# Patient Record
Sex: FEMALE | Race: ASIAN | NOT HISPANIC OR LATINO | URBAN - METROPOLITAN AREA
[De-identification: names, ages, dates, MRNs, and addresses within clinical notes are randomized per-mention and may not be internally consistent; named-entity substitution may affect disease eponyms.]

---

## 2021-11-16 PROBLEM — Z00.00 ENCOUNTER FOR PREVENTIVE HEALTH EXAMINATION: Status: ACTIVE | Noted: 2021-11-16

## 2021-11-23 ENCOUNTER — EMERGENCY (EMERGENCY)
Facility: HOSPITAL | Age: 26
LOS: 1 days | Discharge: HOME | End: 2021-11-23
Attending: EMERGENCY MEDICINE | Admitting: EMERGENCY MEDICINE
Payer: SELF-PAY

## 2021-11-23 VITALS
DIASTOLIC BLOOD PRESSURE: 69 MMHG | SYSTOLIC BLOOD PRESSURE: 117 MMHG | RESPIRATION RATE: 16 BRPM | OXYGEN SATURATION: 100 % | TEMPERATURE: 98 F | HEART RATE: 90 BPM

## 2021-11-23 VITALS
OXYGEN SATURATION: 100 % | RESPIRATION RATE: 18 BRPM | SYSTOLIC BLOOD PRESSURE: 106 MMHG | TEMPERATURE: 99 F | DIASTOLIC BLOOD PRESSURE: 68 MMHG | HEART RATE: 100 BPM

## 2021-11-23 LAB
ALBUMIN SERPL ELPH-MCNC: 5.1 G/DL — HIGH (ref 3.3–5)
ALP SERPL-CCNC: 60 U/L — SIGNIFICANT CHANGE UP (ref 40–120)
ALT FLD-CCNC: 13 U/L — SIGNIFICANT CHANGE UP (ref 4–33)
ANION GAP SERPL CALC-SCNC: 13 MMOL/L — SIGNIFICANT CHANGE UP (ref 7–14)
APPEARANCE UR: CLEAR — SIGNIFICANT CHANGE UP
AST SERPL-CCNC: 16 U/L — SIGNIFICANT CHANGE UP (ref 4–32)
BASOPHILS # BLD AUTO: 0.04 K/UL — SIGNIFICANT CHANGE UP (ref 0–0.2)
BASOPHILS NFR BLD AUTO: 0.4 % — SIGNIFICANT CHANGE UP (ref 0–2)
BILIRUB SERPL-MCNC: 0.2 MG/DL — SIGNIFICANT CHANGE UP (ref 0.2–1.2)
BILIRUB UR-MCNC: NEGATIVE — SIGNIFICANT CHANGE UP
BLD GP AB SCN SERPL QL: NEGATIVE — SIGNIFICANT CHANGE UP
BUN SERPL-MCNC: 7 MG/DL — SIGNIFICANT CHANGE UP (ref 7–23)
CALCIUM SERPL-MCNC: 9.8 MG/DL — SIGNIFICANT CHANGE UP (ref 8.4–10.5)
CHLORIDE SERPL-SCNC: 105 MMOL/L — SIGNIFICANT CHANGE UP (ref 98–107)
CO2 SERPL-SCNC: 21 MMOL/L — LOW (ref 22–31)
COLOR SPEC: SIGNIFICANT CHANGE UP
CREAT SERPL-MCNC: 0.63 MG/DL — SIGNIFICANT CHANGE UP (ref 0.5–1.3)
DIFF PNL FLD: ABNORMAL
EOSINOPHIL # BLD AUTO: 0.16 K/UL — SIGNIFICANT CHANGE UP (ref 0–0.5)
EOSINOPHIL NFR BLD AUTO: 1.7 % — SIGNIFICANT CHANGE UP (ref 0–6)
GLUCOSE SERPL-MCNC: 89 MG/DL — SIGNIFICANT CHANGE UP (ref 70–99)
GLUCOSE UR QL: NEGATIVE — SIGNIFICANT CHANGE UP
HCG SERPL-ACNC: 8678 MIU/ML — SIGNIFICANT CHANGE UP
HCT VFR BLD CALC: 44 % — SIGNIFICANT CHANGE UP (ref 34.5–45)
HGB BLD-MCNC: 14.5 G/DL — SIGNIFICANT CHANGE UP (ref 11.5–15.5)
IANC: 4.45 K/UL — SIGNIFICANT CHANGE UP (ref 1.5–8.5)
IMM GRANULOCYTES NFR BLD AUTO: 0.1 % — SIGNIFICANT CHANGE UP (ref 0–1.5)
KETONES UR-MCNC: NEGATIVE — SIGNIFICANT CHANGE UP
LEUKOCYTE ESTERASE UR-ACNC: NEGATIVE — SIGNIFICANT CHANGE UP
LYMPHOCYTES # BLD AUTO: 3.76 K/UL — HIGH (ref 1–3.3)
LYMPHOCYTES # BLD AUTO: 40.7 % — SIGNIFICANT CHANGE UP (ref 13–44)
MCHC RBC-ENTMCNC: 30.5 PG — SIGNIFICANT CHANGE UP (ref 27–34)
MCHC RBC-ENTMCNC: 33 GM/DL — SIGNIFICANT CHANGE UP (ref 32–36)
MCV RBC AUTO: 92.6 FL — SIGNIFICANT CHANGE UP (ref 80–100)
MONOCYTES # BLD AUTO: 0.82 K/UL — SIGNIFICANT CHANGE UP (ref 0–0.9)
MONOCYTES NFR BLD AUTO: 8.9 % — SIGNIFICANT CHANGE UP (ref 2–14)
NEUTROPHILS # BLD AUTO: 4.45 K/UL — SIGNIFICANT CHANGE UP (ref 1.8–7.4)
NEUTROPHILS NFR BLD AUTO: 48.2 % — SIGNIFICANT CHANGE UP (ref 43–77)
NITRITE UR-MCNC: NEGATIVE — SIGNIFICANT CHANGE UP
NRBC # BLD: 0 /100 WBCS — SIGNIFICANT CHANGE UP
NRBC # FLD: 0 K/UL — SIGNIFICANT CHANGE UP
PH UR: 6 — SIGNIFICANT CHANGE UP (ref 5–8)
PLATELET # BLD AUTO: 269 K/UL — SIGNIFICANT CHANGE UP (ref 150–400)
POTASSIUM SERPL-MCNC: 3.4 MMOL/L — LOW (ref 3.5–5.3)
POTASSIUM SERPL-SCNC: 3.4 MMOL/L — LOW (ref 3.5–5.3)
PROT SERPL-MCNC: 7.9 G/DL — SIGNIFICANT CHANGE UP (ref 6–8.3)
PROT UR-MCNC: NEGATIVE — SIGNIFICANT CHANGE UP
RBC # BLD: 4.75 M/UL — SIGNIFICANT CHANGE UP (ref 3.8–5.2)
RBC # FLD: 12.5 % — SIGNIFICANT CHANGE UP (ref 10.3–14.5)
RH IG SCN BLD-IMP: POSITIVE — SIGNIFICANT CHANGE UP
SODIUM SERPL-SCNC: 139 MMOL/L — SIGNIFICANT CHANGE UP (ref 135–145)
SP GR SPEC: 1.01 — SIGNIFICANT CHANGE UP (ref 1–1.05)
UROBILINOGEN FLD QL: SIGNIFICANT CHANGE UP
WBC # BLD: 9.24 K/UL — SIGNIFICANT CHANGE UP (ref 3.8–10.5)
WBC # FLD AUTO: 9.24 K/UL — SIGNIFICANT CHANGE UP (ref 3.8–10.5)

## 2021-11-23 PROCEDURE — 76801 OB US < 14 WKS SINGLE FETUS: CPT | Mod: 26

## 2021-11-23 PROCEDURE — 99285 EMERGENCY DEPT VISIT HI MDM: CPT

## 2021-11-23 NOTE — ED ADULT TRIAGE NOTE - NS ED NURSE BANDS TYPE
Name band;
Bladder non-tender and non-distended. Urine clear yellow.
Bladder non-tender and non-distended. Urine clear yellow.

## 2021-11-23 NOTE — CONSULT NOTE ADULT - PROBLEM SELECTOR RECOMMENDATION 9
-patient to follow up in 48 hours for repeat b-HCG in the ER  -Rh type:  +.    No need for Rhogam at this time.   - Ectopic precautions reviewed with patient.  Discussed with patient importance of follow up for b-HCG given unknown pregnancy location at this time.  Patient expressed understanding.  All questions and concerns addressed to patient's apparent satisfaction.   - patient to be added to GYN b-HCG list for closer follow up.    - No acute GYN intervention.  Primary management per ED team.      d/w Dr. Salas Fonseca PGY-2 -patient to follow up in 48 hours for repeat b-HCG in the ER  -Rh type:  +.    No need for Rhogam at this time.   - Ectopic precautions reviewed with patient.  Discussed with patient importance of follow up for b-HCG given unknown pregnancy location at this time.  Patient expressed understanding.  All questions and concerns addressed to patient's apparent satisfaction.   - patient to be added to GYN b-HCG list for closer follow up.    - No acute GYN intervention.  Primary management per ED team.      d/w Dr. Salas Fonseca PGY-2    26y/o   LMP 10/19 with PUL, likely failed IUP. Pt to f/u in 48 hours.  Aakash villalpando M.D.

## 2021-11-23 NOTE — ED PROVIDER NOTE - CARE PLAN
1 Principal Discharge DX:	Miscarriage   Principal Discharge DX:	Pregnancy of unknown anatomic location

## 2021-11-23 NOTE — ED PROVIDER NOTE - PROGRESS NOTE DETAILS
Juan SIEGEL: US results reviewed with patient in detail. GYN to evaluate patient. Seen by GYN - see consult note.

## 2021-11-23 NOTE — ED PROVIDER NOTE - PATIENT PORTAL LINK FT
You can access the FollowMyHealth Patient Portal offered by [facility] by registering at the following website: [email or web address]. By joining [facility]’s FollowMyHealth portal, you will also be able to view your health information using other applications (apps) compatible with our system.

## 2021-11-23 NOTE — ED PROVIDER NOTE - OBJECTIVE STATEMENT
26 y/o F no pmhx about 5 weeks pregnant LMP 10/19/21  c/o vaginal bleeding that started 3 days ago but has been becoming more intense. she states that it started as light pink spotting but now with some clots and bright red blood. she complains of pelvic cramping, denies fever, chills, dizziness, sob.

## 2021-11-23 NOTE — CONSULT NOTE ADULT - ASSESSMENT
A/P   25y   LMP 10/19  presents with c/o vaginal bleeding/ cramping  found to have a b-HCG of 8678.  Differential includes threatened AB vs. ectopic pregnancy vs. viable IUP vs. spontaneous  in progress.  Difficult to assess at this time.  Vital signs stable and labs within normal limits. Physical exam benign and patient with minimal bleeding. TVUS showing subchorionic hematoma, hemorrhagic contents within endometrial cavity, and hypoechoic structure within uterine horn possibly a gestational sac.

## 2021-11-23 NOTE — ED PROVIDER NOTE - CLINICAL SUMMARY MEDICAL DECISION MAKING FREE TEXT BOX
24 y/o F no pmhx about 5 weeks pregnant LMP 10/19/21  c/o vaginal bleeding that started 3 days ago but has been becoming more intense. -- +bleeding on speculum exam, no clots noted, no adnexal tenderness. denies fever, chills, headache, chest pain, sob.-- concern for early miscarriage. labs, T&S, hcg, US.

## 2021-11-23 NOTE — CONSULT NOTE ADULT - SUBJECTIVE AND OBJECTIVE BOX
AMANDA TOUSSAINT  25y  Female [id number]    HPI: 25 y.o.  @5w1d (LMP 10/19) accompanied by partner presenting to the ED with vaginal bleeding consulting GYN for pregnancy of unknown location. Patient reports that she had a positive urine pregnancy test  then began having vaginal spotting 11/15 which turned into passing of small clots in the past day. She reports that she has had to change a pad approximately two times a day but sees more bleeding while urinating. She reports she has felt some nausea and cramping. She denies any chills, fevers, vomiting. Of note, she recently moved from [place] and insurance will not be started until  so she will not be able to see a provider until then. This is a desired pregnancy.       Ob/Gyn Physician: N/A, has never seen a GYN    Obhx: first pregnancy, desired  GynHx: Denies fibroids, cysts, abnormal pap smears, STIs  PMH: denies  PSH: denies  FMH:   - Father: DM   Social: Denies Toxic Habits x3; denies anxiety/depression  Meds: PNV  Allergies: Cyclobenzaprine -> anaphylaxis        Vital Signs Last 24 Hrs  T(C): 37 (2021 21:32), Max: 37 (2021 21:32)  T(F): 98.6 (2021 21:32), Max: 98.6 (2021 21:32)  HR: 100 (2021 21:32) (90 - 100)  BP: 106/68 (2021 21:32) (106/68 - 117/69)  BP(mean): --  RR: 18 (2021 21:32) (16 - 18)  SpO2: 100% (2021 21:32) (100% - 100%)    Physical Exam:   General: sitting comfortably in bed, NAD   CV: RR S1S2 no m/r/g  Lungs: CTA b/l, unlabored on RA  Back: No CVA tenderness  Abd: Soft, non-tender, non-distended,  +BS  :  Minimal bleeding on pad.  External labia wnl.   Speculum Exam: patient declined  Ext: non-tender b/l, no edema     LABS:                              14.5   9.24  )-----------( 269      ( 2021 19:04 )             44.0         139  |  105  |  7   ----------------------------<  89  3.4<L>   |  21<L>  |  0.63    Ca    9.8      2021 19:04    TPro  7.9  /  Alb  5.1<H>  /  TBili  0.2  /  DBili  x   /  AST  16  /  ALT  13  /  AlkPhos  60      I&O's Detail      Urinalysis Basic - ( 2021 19:04 )    Color: Light Yellow / Appearance: Clear / S.013 / pH: x  Gluc: x / Ketone: Negative  / Bili: Negative / Urobili: <2 mg/dL   Blood: x / Protein: Negative / Nitrite: Negative   Leuk Esterase: Negative / RBC: 16 /HPF / WBC 0 /HPF   Sq Epi: x / Non Sq Epi: 2 /HPF / Bacteria: Few        RADIOLOGY & ADDITIONAL STUDIES:    < from: US OB <=14 Weeks, First Gestation (21 @ 19:23) >    EXAM:  US OB LES THAN 14 WKS 1ST GEST        PROCEDURE DATE:  2021         INTERPRETATION:  CLINICAL INFORMATION: Vaginal bleeding during pregnancy (as per history).    LMP: 10/19/2021  Estimated Gestational Age by LMP: 5 weeks and 0 days  HCG not available at time of reporting.    COMPARISON: None available.    Endovaginal pelvic sonogram only. Color and Spectral Doppler was performed.    FINDINGS:    Uterus: The uterine measures 7.9 x 3.3 x 4.9 cm. Uterine morphology is not well assessed on this study, however there is suggestion of arcuate contour of the fundus, best seen on transverse uterus cine clip. Correlation with pelvic MRI is recommended for characterization for potential uterine fundal anomaly. Cervix appears closed, image 22.    The endometrial cavity is markedly distended with heterogeneous internal contents, likely reflecting hemorrhagic material measuring approximately 4.0 x 1.6 x 2.3 cm. Along one of the uterine horns, best seen on transverse uterus cine clip, there is a hypoechoic structure with surrounding double decidual sign suggestive of gestational sac, irregular in shape, measuring 0.9 x 0.4 x 0.5 cm, mean sac diameter 0.6 cm corresponding to 5 weeks and 1 day gestation. No fetal pole or yolk sac is identified.    Right ovary: Not visualized.    Left ovary: 5.5 x 3.0 x 4.0 cm, inclusive of a corpus luteum versus hemorrhagic cyst which measures 2.9 x 2.3 x 3.8 cm. Additional smaller cyst versus follicle of the left ovary is identified. Vascular flowis identified of the left ovary.    Free fluid: Trace fluid.    IMPRESSION:    Distended endometrial cavity with probable subchorionic hematoma measured 4.0 x 1.6 x 2.3 cm. Possible irregular shaped gestational sac near one of the uterine horns corresponding to 5 weeks and 1 day gestation. No fetal pole or yolk sac is identified at this time. Recommend correlation with hCG, OB/GYN evaluation, and short-term follow-up ultrasound.    Suggestion of arcuate contour of the fundus. Correlation with pelvic MRI is recommended for characterization of potential uterine fundal anomaly.    Left ovary corpus luteal versus hemorrhagic cyst.    Nonvisualized right ovary.    --- End of Report ---            BILLIE CONLEY MD; Resident Radiology  This document has been electronically signed.  LEONEL AGUIRRE M.D., ATTENDING RADIOLOGIST  This document has been electronically signed. 2021  8:01PM    < end of copied text >   AMANDA TOUSSAINT  25y  Female [id number]    HPI: 25 y.o.  @5w1d (LMP 10/19) accompanied by partner presenting to the ED with vaginal bleeding consulting GYN for pregnancy of unknown location. Patient reports that she had a positive urine pregnancy test  then began having vaginal spotting 11/15 which turned into passing of small clots in the past day. She reports that she has had to change a pad approximately two times a day but sees more bleeding while urinating. She reports she has felt some nausea and cramping. She denies any chills, fevers, vomiting. Of note, she recently moved from [place] and insurance will not be started until  so she will not be able to see a provider until then. This is a desired pregnancy.     HCG(): 8678    Ob/Gyn Physician: N/A, has never seen a GYN    Obhx: first pregnancy, desired  GynHx: Denies fibroids, cysts, abnormal pap smears, STIs  PMH: denies  PSH: denies  FMH:   - Father: DM   Social: Denies Toxic Habits x3; denies anxiety/depression  Meds: PNV  Allergies: Cyclobenzaprine -> anaphylaxis        Vital Signs Last 24 Hrs  T(C): 37 (2021 21:32), Max: 37 (2021 21:32)  T(F): 98.6 (2021 21:32), Max: 98.6 (2021 21:32)  HR: 100 (2021 21:32) (90 - 100)  BP: 106/68 (2021 21:32) (106/68 - 117/69)  BP(mean): --  RR: 18 (2021 21:32) (16 - 18)  SpO2: 100% (2021 21:32) (100% - 100%)    Physical Exam:   General: sitting comfortably in bed, NAD   CV: RR S1S2 no m/r/g  Lungs: CTA b/l, unlabored on RA  Back: No CVA tenderness  Abd: Soft, non-tender, non-distended,  +BS  :  Minimal bleeding on pad.  External labia wnl.   Speculum Exam: patient declined  Ext: non-tender b/l, no edema     LABS:                              14.5   9.24  )-----------( 269      ( 2021 19:04 )             44.0         139  |  105  |  7   ----------------------------<  89  3.4<L>   |  21<L>  |  0.63    Ca    9.8      2021 19:04    TPro  7.9  /  Alb  5.1<H>  /  TBili  0.2  /  DBili  x   /  AST  16  /  ALT  13  /  AlkPhos  60      I&O's Detail      Urinalysis Basic - ( 2021 19:04 )    Color: Light Yellow / Appearance: Clear / S.013 / pH: x  Gluc: x / Ketone: Negative  / Bili: Negative / Urobili: <2 mg/dL   Blood: x / Protein: Negative / Nitrite: Negative   Leuk Esterase: Negative / RBC: 16 /HPF / WBC 0 /HPF   Sq Epi: x / Non Sq Epi: 2 /HPF / Bacteria: Few        RADIOLOGY & ADDITIONAL STUDIES:    < from: US OB <=14 Weeks, First Gestation (21 @ 19:23) >    EXAM:  US OB LES THAN 14 WKS 1ST GEST        PROCEDURE DATE:  2021         INTERPRETATION:  CLINICAL INFORMATION: Vaginal bleeding during pregnancy (as per history).    LMP: 10/19/2021  Estimated Gestational Age by LMP: 5 weeks and 0 days  HCG not available at time of reporting.    COMPARISON: None available.    Endovaginal pelvic sonogram only. Color and Spectral Doppler was performed.    FINDINGS:    Uterus: The uterine measures 7.9 x 3.3 x 4.9 cm. Uterine morphology is not well assessed on this study, however there is suggestion of arcuate contour of the fundus, best seen on transverse uterus cine clip. Correlation with pelvic MRI is recommended for characterization for potential uterine fundal anomaly. Cervix appears closed, image 22.    The endometrial cavity is markedly distended with heterogeneous internal contents, likely reflecting hemorrhagic material measuring approximately 4.0 x 1.6 x 2.3 cm. Along one of the uterine horns, best seen on transverse uterus cine clip, there is a hypoechoic structure with surrounding double decidual sign suggestive of gestational sac, irregular in shape, measuring 0.9 x 0.4 x 0.5 cm, mean sac diameter 0.6 cm corresponding to 5 weeks and 1 day gestation. No fetal pole or yolk sac is identified.    Right ovary: Not visualized.    Left ovary: 5.5 x 3.0 x 4.0 cm, inclusive of a corpus luteum versus hemorrhagic cyst which measures 2.9 x 2.3 x 3.8 cm. Additional smaller cyst versus follicle of the left ovary is identified. Vascular flowis identified of the left ovary.    Free fluid: Trace fluid.    IMPRESSION:    Distended endometrial cavity with probable subchorionic hematoma measured 4.0 x 1.6 x 2.3 cm. Possible irregular shaped gestational sac near one of the uterine horns corresponding to 5 weeks and 1 day gestation. No fetal pole or yolk sac is identified at this time. Recommend correlation with hCG, OB/GYN evaluation, and short-term follow-up ultrasound.    Suggestion of arcuate contour of the fundus. Correlation with pelvic MRI is recommended for characterization of potential uterine fundal anomaly.    Left ovary corpus luteal versus hemorrhagic cyst.    Nonvisualized right ovary.    --- End of Report ---            BILLIE CONLEY MD; Resident Radiology  This document has been electronically signed.  LEONEL AGUIRRE M.D., ATTENDING RADIOLOGIST  This document has been electronically signed. 2021  8:01PM    < end of copied text >

## 2021-11-23 NOTE — ED PROVIDER NOTE - NSFOLLOWUPINSTRUCTIONS_ED_ALL_ED_FT
Please follow Up in 2 days for repeat blood test and possible sonogram. If you experience pelvic pain, worsening bleeding please return to the ED    A miscarriage is the loss of a fetus before 20 weeks of pregnancy. A miscarriage may also be called a spontaneous  or an early pregnancy loss.    What causes a miscarriage? The cause of your miscarriage may not be known. The following may increase the risk:  •Being 35 years or older    •Genetic problems in the fetus    •Poorly controlled diabetes, high blood pressure, or thyroid disease    •An infection such as toxoplasmosis or syphilis    •Drinking alcohol or caffeine, smoking, or using drugs during pregnancy    •Being overweight or underweight    •Problems with the uterus, placenta, or cervix    What are the signs and symptoms of a miscarriage? You may not have symptoms of a miscarriage or you may have any of the following:  •Vaginal spotting or heavy bleeding    •Pain or cramping in your abdomen or lower back    •Discharge of bloody fluid, tissue, or blood clots from your vagina    •Nausea or vomiting    •Dizziness    How is a miscarriage treated? You may not need treatment for a miscarriage. You may need any of the following if you have heavy bleeding or tissue left in your uterus after the miscarriage:  •Medicine may be given to control bleeding and prevent infection. Medicine may also be given to control pain and prevent complications in future pregnancies.    •Surgery may be needed to remove the tissue left in your uterus. Surgery may include a dilation and curettage (D&C) or a dilation and evacuation (D&E). Surgery may also be needed to control bleeding or prevent an infection.    How can I care for myself after a miscarriage?   •Do not put anything in your vagina for 2 weeks or as directed. Do not use tampons, douche, or have sex. These actions can cause infection and pain.    •Use sanitary pads as needed. You may have light bleeding or spotting for 2 weeks.    •Do not take a bath or go swimming for 2 weeks or as directed. These actions may increase your risk for an infection. Take showers only.    •Rest as needed. Slowly start to do more each day. Return to your daily activities as directed.     •Talk to your healthcare provider about birth control. If you would like to prevent another pregnancy, ask your healthcare provider which type of birth control is best for you.    •Join a support group or therapy to help you cope. A miscarriage may be very difficult for you, your partner, and other members of your family. There is no right way to feel after a miscarriage. You may feel overwhelming grief or other emotions. It may be helpful to talk to a friend, family member, or counselor about your feelings. You may worry that you could have another miscarriage. Talk to your healthcare provider about your concerns. He or she may be able to help you reduce the risk for another miscarriage. He or she may also help you find ways to cope with grief.    Where can I find more information?   •The [employer]  [address] [address]  [place] [address]  Phone: [phone]  Phone: 1-798.995.7917  Web Address: http://www.acog.org    •[facility]  [address] [place] [address]  Web Address: http://www.Snapwiz    When should I seek immediate care?   •You have foul-smelling drainage or pus coming from your vagina.    •You have heavy vaginal bleeding and soak 1 pad or more in an hour.    •You have severe abdominal pain.    •You feel like your heart is beating faster than normal.    •You feel extremely weak or dizzy.    When should I contact my healthcare provider?   •You have a fever greater than 100.4°F or chills.    •You have extreme sadness, grief, or feel unable to cope with what has happened.    •You have questions or concerns about your condition or care.

## 2021-11-23 NOTE — ED ADULT TRIAGE NOTE - CHIEF COMPLAINT QUOTE
Pt states that she had positive home pregnancy test and has been spotting for the last week and passed a clot today.  LMP 10/19

## 2021-11-23 NOTE — ED PROVIDER NOTE - NEURO NEGATIVE STATEMENT, MLM
no loss of consciousness, no gait abnormality, no headache, no dizziness, no sensory deficits, and no weakness.

## 2021-11-23 NOTE — ED PROVIDER NOTE - ATTENDING CONTRIBUTION TO CARE
Patient is a 24 yo F , LMP 10/19/21 here for evaluation of worsening spotting. She states she noticed blood when wiping on 21, tested positive at home for a pregnancy test. Spotting became worse 3 days ago and is now increased. She states she has lower abdominal cramping. No fertility treatments. No fevers, chills, nausea, vomiting. No history of STDs. She states she and her  have only been trying for a few months.     VS noted  Gen: tearful  HEENT: EOMI, mmm  Lungs: CTAB/L no C/ W /R   CVS: RRR   Abd; Soft non tender, non distended  : done by NP   Ext: no edema  Skin: no rash  Neuro AAOx3 non focal clear speech  a/p: vaginal bleeding in pregnancy - concern for threatened AB, ectopic pregnancy. plan for labs including beta-hcg, TVUS, likely GYN evaluation for close follow up.  - Juan SIEGEL

## 2021-11-23 NOTE — ED ADULT NURSE NOTE - OBJECTIVE STATEMENT
26 y/o F received to intake room 4 c/o vaginal bleeding. pt a&ox4 and amb. pt states for 3 days shes had mild vaginal bleeding. pt is approximately 4 weeks pregnant . pt respiration even and unlabored. pt abd soft nontender nondistended. pt denies lightheadedness or dizziness. VS as noted, call bell in reach, comfort measures provided, 20g iv placed R AC, labs drawn and sent, will continue to monitor.

## 2021-11-24 DIAGNOSIS — O36.80X0 PREGNANCY WITH INCONCLUSIVE FETAL VIABILITY, NOT APPLICABLE OR UNSPECIFIED: ICD-10-CM

## 2021-11-24 NOTE — CHART NOTE - NSCHARTNOTEFT_GEN_A_CORE
Called patient phone [phone].  Patient answered.  Patient confirmed she will present to ED tomorrow 11/25 for followup serum bHCG.    Amyeo Afroz Jereen, PGY-1 Called patient phone [phone].  Left voicemail regarding clinic appointment Monday/Tuesday, with [facility] clinic for followup to confirm IUP.  Will attempt to call tomorrow to notify patient    Amyeo Afroz Jereen, PGY-1

## 2021-11-25 ENCOUNTER — EMERGENCY (EMERGENCY)
Facility: HOSPITAL | Age: 26
LOS: 1 days | Discharge: HOME | End: 2021-11-25
Attending: EMERGENCY MEDICINE | Admitting: EMERGENCY MEDICINE
Payer: SELF-PAY

## 2021-11-25 VITALS
OXYGEN SATURATION: 100 % | SYSTOLIC BLOOD PRESSURE: 114 MMHG | RESPIRATION RATE: 20 BRPM | HEART RATE: 60 BPM | TEMPERATURE: 99 F | DIASTOLIC BLOOD PRESSURE: 77 MMHG

## 2021-11-25 VITALS
SYSTOLIC BLOOD PRESSURE: 117 MMHG | OXYGEN SATURATION: 100 % | DIASTOLIC BLOOD PRESSURE: 65 MMHG | TEMPERATURE: 98 F | RESPIRATION RATE: 16 BRPM | HEART RATE: 84 BPM

## 2021-11-25 PROBLEM — Z78.9 OTHER SPECIFIED HEALTH STATUS: Chronic | Status: ACTIVE | Noted: 2021-11-23

## 2021-11-25 LAB
ALBUMIN SERPL ELPH-MCNC: 4.5 G/DL — SIGNIFICANT CHANGE UP (ref 3.3–5)
ALP SERPL-CCNC: 52 U/L — SIGNIFICANT CHANGE UP (ref 40–120)
ALT FLD-CCNC: 14 U/L — SIGNIFICANT CHANGE UP (ref 4–33)
ANION GAP SERPL CALC-SCNC: 13 MMOL/L — SIGNIFICANT CHANGE UP (ref 7–14)
APPEARANCE UR: CLEAR — SIGNIFICANT CHANGE UP
AST SERPL-CCNC: 14 U/L — SIGNIFICANT CHANGE UP (ref 4–32)
BACTERIA # UR AUTO: NEGATIVE — SIGNIFICANT CHANGE UP
BASOPHILS # BLD AUTO: 0.03 K/UL — SIGNIFICANT CHANGE UP (ref 0–0.2)
BASOPHILS NFR BLD AUTO: 0.3 % — SIGNIFICANT CHANGE UP (ref 0–2)
BILIRUB SERPL-MCNC: 0.2 MG/DL — SIGNIFICANT CHANGE UP (ref 0.2–1.2)
BILIRUB UR-MCNC: NEGATIVE — SIGNIFICANT CHANGE UP
BUN SERPL-MCNC: 10 MG/DL — SIGNIFICANT CHANGE UP (ref 7–23)
CALCIUM SERPL-MCNC: 9.7 MG/DL — SIGNIFICANT CHANGE UP (ref 8.4–10.5)
CHLORIDE SERPL-SCNC: 105 MMOL/L — SIGNIFICANT CHANGE UP (ref 98–107)
CO2 SERPL-SCNC: 20 MMOL/L — LOW (ref 22–31)
COLOR SPEC: SIGNIFICANT CHANGE UP
CREAT SERPL-MCNC: 0.68 MG/DL — SIGNIFICANT CHANGE UP (ref 0.5–1.3)
DIFF PNL FLD: ABNORMAL
EOSINOPHIL # BLD AUTO: 0.17 K/UL — SIGNIFICANT CHANGE UP (ref 0–0.5)
EOSINOPHIL NFR BLD AUTO: 1.9 % — SIGNIFICANT CHANGE UP (ref 0–6)
EPI CELLS # UR: 2 /HPF — SIGNIFICANT CHANGE UP (ref 0–5)
GLUCOSE SERPL-MCNC: 81 MG/DL — SIGNIFICANT CHANGE UP (ref 70–99)
GLUCOSE UR QL: NEGATIVE — SIGNIFICANT CHANGE UP
HCG SERPL-ACNC: SIGNIFICANT CHANGE UP MIU/ML
HCT VFR BLD CALC: 40.7 % — SIGNIFICANT CHANGE UP (ref 34.5–45)
HGB BLD-MCNC: 13.8 G/DL — SIGNIFICANT CHANGE UP (ref 11.5–15.5)
HYALINE CASTS # UR AUTO: 0 /LPF — SIGNIFICANT CHANGE UP (ref 0–7)
IANC: 4.56 K/UL — SIGNIFICANT CHANGE UP (ref 1.5–8.5)
IMM GRANULOCYTES NFR BLD AUTO: 0.2 % — SIGNIFICANT CHANGE UP (ref 0–1.5)
KETONES UR-MCNC: NEGATIVE — SIGNIFICANT CHANGE UP
LEUKOCYTE ESTERASE UR-ACNC: NEGATIVE — SIGNIFICANT CHANGE UP
LYMPHOCYTES # BLD AUTO: 3.07 K/UL — SIGNIFICANT CHANGE UP (ref 1–3.3)
LYMPHOCYTES # BLD AUTO: 35 % — SIGNIFICANT CHANGE UP (ref 13–44)
MCHC RBC-ENTMCNC: 30.7 PG — SIGNIFICANT CHANGE UP (ref 27–34)
MCHC RBC-ENTMCNC: 33.9 GM/DL — SIGNIFICANT CHANGE UP (ref 32–36)
MCV RBC AUTO: 90.4 FL — SIGNIFICANT CHANGE UP (ref 80–100)
MONOCYTES # BLD AUTO: 0.92 K/UL — HIGH (ref 0–0.9)
MONOCYTES NFR BLD AUTO: 10.5 % — SIGNIFICANT CHANGE UP (ref 2–14)
NEUTROPHILS # BLD AUTO: 4.56 K/UL — SIGNIFICANT CHANGE UP (ref 1.8–7.4)
NEUTROPHILS NFR BLD AUTO: 52.1 % — SIGNIFICANT CHANGE UP (ref 43–77)
NITRITE UR-MCNC: NEGATIVE — SIGNIFICANT CHANGE UP
NRBC # BLD: 0 /100 WBCS — SIGNIFICANT CHANGE UP
NRBC # FLD: 0 K/UL — SIGNIFICANT CHANGE UP
PH UR: 7 — SIGNIFICANT CHANGE UP (ref 5–8)
PLATELET # BLD AUTO: 266 K/UL — SIGNIFICANT CHANGE UP (ref 150–400)
POTASSIUM SERPL-MCNC: 3.6 MMOL/L — SIGNIFICANT CHANGE UP (ref 3.5–5.3)
POTASSIUM SERPL-SCNC: 3.6 MMOL/L — SIGNIFICANT CHANGE UP (ref 3.5–5.3)
PROT SERPL-MCNC: 7.1 G/DL — SIGNIFICANT CHANGE UP (ref 6–8.3)
PROT UR-MCNC: NEGATIVE — SIGNIFICANT CHANGE UP
RBC # BLD: 4.5 M/UL — SIGNIFICANT CHANGE UP (ref 3.8–5.2)
RBC # FLD: 12.4 % — SIGNIFICANT CHANGE UP (ref 10.3–14.5)
RBC CASTS # UR COMP ASSIST: 5 /HPF — HIGH (ref 0–4)
SODIUM SERPL-SCNC: 138 MMOL/L — SIGNIFICANT CHANGE UP (ref 135–145)
SP GR SPEC: 1.01 — SIGNIFICANT CHANGE UP (ref 1–1.05)
UROBILINOGEN FLD QL: SIGNIFICANT CHANGE UP
WBC # BLD: 8.77 K/UL — SIGNIFICANT CHANGE UP (ref 3.8–10.5)
WBC # FLD AUTO: 8.77 K/UL — SIGNIFICANT CHANGE UP (ref 3.8–10.5)
WBC UR QL: 1 /HPF — SIGNIFICANT CHANGE UP (ref 0–5)

## 2021-11-25 PROCEDURE — 99285 EMERGENCY DEPT VISIT HI MDM: CPT

## 2021-11-25 PROCEDURE — 76817 TRANSVAGINAL US OBSTETRIC: CPT | Mod: 26

## 2021-11-25 NOTE — ED PROVIDER NOTE - PROGRESS NOTE DETAILS
FELIX Headley- Pt received at sign out pending TVUS and OB recs. TVUS showing "abnormal intrauterine pregnancy with large subchorionic hematoma". Discussed with OB resident, state that patient can f.u outpatient in clinic. Results reviewed and discussed with attending. Pt stable for DC home and outpatient f/u. Strict return instructions given. All questions and concerns addressed.

## 2021-11-25 NOTE — ED PROVIDER NOTE - PHYSICAL EXAMINATION
VS: wnl.  Physical Exam: adult F, anxious-appearing, NCAT, PERRL, EOMI, neck supple, RRR, CTA B, Abd: s/nd/mild suprapubic ttp. Ext: no edema, Neuro: AAOx3, ambulates w/o diff, strength 5/5 & symmetric throughout.  Pelvic: deferred

## 2021-11-25 NOTE — ED PROVIDER NOTE - OBJECTIVE STATEMENT
26 yo , 5 wks 3d by dates, here for 48 hrs HCG/US recheck.  Seen in ED 48 hrs ago with preg of unk location.    Duration:   spotting on and off since 11/15/21; slowly getting better  Associated Sx: mild lower abd cramping

## 2021-11-25 NOTE — ED PROVIDER NOTE - ATTENDING CONTRIBUTION TO CARE
MD Foster:  patient seen and evaluated with the resident.  I was present for key portions of the History and Physical, and I agree with the Impression and Plan.    26 yo , 5 wks 3d by dates, here for 48 hrs HCG/US recheck.  Seen in ED 48 hrs ago with preg of unk location.    Duration:   spotting on and off since 11/15/21; slowly getting better  Associated Sx: mild lower abd cramping  VS: wnl.  Physical Exam: adult F, anxious-appearing, NCAT, PERRL, EOMI, neck supple, RRR, CTA B, Abd: s/nd/mild suprapubic ttp. Ext: no edema, Neuro: AAOx3, ambulates w/o diff, strength 5/5 & symmetric throughout.  Pelvic: deferred  Impression:  threatened AB vs ectopic, will perform 48 hr recheck of hcg and us  Plan:  cbc, cmp, UA/UCx, HCG, TVUS.

## 2021-11-25 NOTE — ED PROVIDER NOTE - CLINICAL SUMMARY MEDICAL DECISION MAKING FREE TEXT BOX
Impression:  threatened AB vs ectopic, will perform 48 hr recheck of hcg and us  Plan:  cbc, cmp, UA/UCx, HCG, TVUS.

## 2021-11-25 NOTE — ED PROVIDER NOTE - PATIENT PORTAL LINK FT
You can access the FollowMyHealth Patient Portal offered by [facility] by registering at the following website: [email or web address]. By joining [facility]’s FollowMyHealth portal, you will also be able to view your health information using other applications (apps) compatible with our system.

## 2021-11-25 NOTE — ED PROVIDER NOTE - NSFOLLOWUPINSTRUCTIONS_ED_ALL_ED_FT
Patient advised to follow up with OBGYN within 48-72 hours   and told to return to the emergency department immediately for any new or concerning symptoms such as WORSENING PAIN/BLEEDING OR ANY OTHER COMPLAINTS. Patient agrees with plan.    Rest, stay hydrated   OB/GYN (referal list provided or call [phone] to make an appointment with the OB/GYN clinic)    Advance activity as tolerated.  Continue all previously prescribed medications as directed unless otherwise instructed.  Follow up with your primary care physician in 48-72 hours- bring copies of your results.  Return to the ER for worsening or persistent symptoms, and/or ANY NEW OR CONCERNING SYMPTOMS. If you have issues obtaining follow up, please call: [phone] to obtain a doctor or specialist who takes your insurance in your area.  You may call [phone] to make an appointment with the internal medicine clinic.

## 2021-11-25 NOTE — ED ADULT TRIAGE NOTE - CHIEF COMPLAINT QUOTE
pt return for vag. bleeding r/o miscarriage, pt is approx 5 wks, preg. pt was seen 2 days for blood test and US

## 2021-12-03 NOTE — CHART NOTE - NSCHARTNOTEFT_GEN_A_CORE
Pt called. Pt answered, says she is out of town. Patient informed us that she visited an urgent care location Monday Nov 29 for bHCG level that showed elevated bCHG from before 62118,  (11/25), could not clarify what the number was. No ultrasounds were done at this urgent care visit.   Pt says she called the clinic and is waiting on a called back with appointment date. Reviewed ectopic precautions. Pt noted she has been feeling more tired, lethargic with persistent HA but otherwise well.   Advised pt see provider as soon as possible. Pt will followup once she is back in town.    Amyeo Afroz Jereen, PGY-1
R2 Chart Note     Patient called to confirm follow-up plan for her pregnancy of unknown location. No answer at provided patient's phone number. Appointment was provided by Margaret Hegarty yesterday but patient stated that she will be out of town and would not be able to attend appointment. Will call again tomorrow.    Mabel Royal, PGY2
Given inability to contact patient, a certified letter was sent to inform patient of the dangers of ectopic pregnancies/ pregnancy of unknown location without appropriate medical management and intervention.  CL# sent on 12/2/2021.    Amyeo Afroz Jereen, PGY-1

## 2021-12-09 ENCOUNTER — RESULT REVIEW (OUTPATIENT)
Age: 26
End: 2021-12-09

## 2021-12-09 ENCOUNTER — OUTPATIENT (OUTPATIENT)
Dept: OUTPATIENT SERVICES | Facility: HOSPITAL | Age: 26
LOS: 1 days | End: 2021-12-09
Payer: COMMERCIAL

## 2021-12-09 ENCOUNTER — APPOINTMENT (OUTPATIENT)
Dept: OBGYN | Facility: HOSPITAL | Age: 26
End: 2021-12-09
Payer: COMMERCIAL

## 2021-12-09 VITALS
HEIGHT: 63 IN | HEART RATE: 79 BPM | BODY MASS INDEX: 27.46 KG/M2 | SYSTOLIC BLOOD PRESSURE: 104 MMHG | TEMPERATURE: 98.5 F | DIASTOLIC BLOOD PRESSURE: 70 MMHG | WEIGHT: 155 LBS

## 2021-12-09 DIAGNOSIS — O36.80X0 PREGNANCY WITH INCONCLUSIVE FETAL VIABILITY, NOT APPLICABLE OR UNSPECIFIED: ICD-10-CM

## 2021-12-09 DIAGNOSIS — N93.9 ABNORMAL UTERINE AND VAGINAL BLEEDING, UNSPECIFIED: ICD-10-CM

## 2021-12-09 LAB
BASOPHILS # BLD AUTO: 0.04 K/UL — SIGNIFICANT CHANGE UP (ref 0–0.2)
BASOPHILS NFR BLD AUTO: 0.4 % — SIGNIFICANT CHANGE UP (ref 0–2)
EOSINOPHIL # BLD AUTO: 0.24 K/UL — SIGNIFICANT CHANGE UP (ref 0–0.5)
EOSINOPHIL NFR BLD AUTO: 2.4 % — SIGNIFICANT CHANGE UP (ref 0–6)
HCG SERPL-ACNC: SIGNIFICANT CHANGE UP MIU/ML
HCT VFR BLD CALC: 40.1 % — SIGNIFICANT CHANGE UP (ref 34.5–45)
HGB BLD-MCNC: 13.2 G/DL — SIGNIFICANT CHANGE UP (ref 11.5–15.5)
IANC: 5.72 K/UL — SIGNIFICANT CHANGE UP (ref 1.5–8.5)
IMM GRANULOCYTES NFR BLD AUTO: 0.4 % — SIGNIFICANT CHANGE UP (ref 0–1.5)
LYMPHOCYTES # BLD AUTO: 2.96 K/UL — SIGNIFICANT CHANGE UP (ref 1–3.3)
LYMPHOCYTES # BLD AUTO: 29.7 % — SIGNIFICANT CHANGE UP (ref 13–44)
MCHC RBC-ENTMCNC: 30.4 PG — SIGNIFICANT CHANGE UP (ref 27–34)
MCHC RBC-ENTMCNC: 32.9 GM/DL — SIGNIFICANT CHANGE UP (ref 32–36)
MCV RBC AUTO: 92.4 FL — SIGNIFICANT CHANGE UP (ref 80–100)
MONOCYTES # BLD AUTO: 0.97 K/UL — HIGH (ref 0–0.9)
MONOCYTES NFR BLD AUTO: 9.7 % — SIGNIFICANT CHANGE UP (ref 2–14)
NEUTROPHILS # BLD AUTO: 5.72 K/UL — SIGNIFICANT CHANGE UP (ref 1.8–7.4)
NEUTROPHILS NFR BLD AUTO: 57.4 % — SIGNIFICANT CHANGE UP (ref 43–77)
NRBC # BLD: 0 /100 WBCS — SIGNIFICANT CHANGE UP
NRBC # FLD: 0 K/UL — SIGNIFICANT CHANGE UP
PLATELET # BLD AUTO: 263 K/UL — SIGNIFICANT CHANGE UP (ref 150–400)
RBC # BLD: 4.34 M/UL — SIGNIFICANT CHANGE UP (ref 3.8–5.2)
RBC # FLD: 12.8 % — SIGNIFICANT CHANGE UP (ref 10.3–14.5)
TSH SERPL-MCNC: 2.75 UIU/ML — SIGNIFICANT CHANGE UP (ref 0.27–4.2)
WBC # BLD: 9.97 K/UL — SIGNIFICANT CHANGE UP (ref 3.8–10.5)
WBC # FLD AUTO: 9.97 K/UL — SIGNIFICANT CHANGE UP (ref 3.8–10.5)

## 2021-12-09 PROCEDURE — 99202 OFFICE O/P NEW SF 15 MIN: CPT | Mod: GC

## 2021-12-09 PROCEDURE — 76830 TRANSVAGINAL US NON-OB: CPT | Mod: 26

## 2021-12-10 NOTE — COUNSELING
[Nutrition/ Exercise/ Weight Management] : nutrition, exercise, weight management [Vitamins/Supplements] : vitamins/supplements [Pregnancy Options] : pregnancy options [STD (testing, results, tx)] : STD (testing, results, tx) [Medication Management] : medication management

## 2021-12-11 PROBLEM — N93.9 COMPLAINT OF VAGINAL BLEEDING: Status: ACTIVE | Noted: 2021-12-09

## 2021-12-11 PROBLEM — O36.80X0 PREGNANCY OF UNKNOWN ANATOMIC LOCATION: Status: ACTIVE | Noted: 2021-11-29

## 2021-12-11 LAB
CANDIDA AB TITR SER: SIGNIFICANT CHANGE UP
G VAGINALIS DNA SPEC QL NAA+PROBE: DETECTED
T VAGINALIS SPEC QL WET PREP: SIGNIFICANT CHANGE UP

## 2021-12-11 NOTE — REVIEW OF SYSTEMS
[Fatigue] : fatigue [Recent Weight Gain (___ Lbs)] : recent [unfilled] ~Ulb weight gain [SOB on Exertion] : shortness of breath on exertion [Abdominal Pain] : abdominal pain [Abn Vaginal bleeding] : abnormal vaginal bleeding [Headache] : headache [Fever] : no fever [Chills] : no chills [Night Sweats] : no night sweats [Dyspnea] : no dyspnea [Cough] : no cough [Wheezing] : no wheezing [Chest Pain] : no chest pain [Lower Ext Edema] : no lower extremity edema [Heartburn] : no heartburn [Nausea] : no nausea [Urgency] : no urgency [Dysuria] : no dysuria [Urethral Discharge] : no urethral discharge [Genital Rash/Irritation] : no genital rash/irritation [Breast Pain] : no breast pain [Skin Rash] : no skin rash [Dizziness] : no dizziness [Fainting] : no fainting [FreeTextEntry7] : "Mild, less than a period cramps"

## 2021-12-11 NOTE — PLAN
[FreeTextEntry1] : Ms. Smith is a 26y/o  with  a desired intrauterine pregnancy. \par \par #Intrauterine pregnancy. \par - Prenatal care established at [facility] at [place] on . Pt desires to continue to follow there. \par - Prenatal vitamins prescribed. Pt does not take any daily medication. Will only use acetaminophen for HA relief from this point going forward. \par - CBC and TSH drawn to assess underlying reasons for fatigue. Explained the pathophysiology of first trimester pregnancy. \par - Vaginitis panel sent. \par - Return precautions given. Discussed signs and symptoms of a miscarriage. Pt instructed to continue to refrain from sexual intercourse until next OB appointment. \par \par RTC: PRN\par DW: Dr. Mcnally \par Shira Smith, PGY-1

## 2021-12-11 NOTE — PHYSICAL EXAM
[Chaperone Present] : A chaperone was present in the examining room during all aspects of the physical examination [Appropriately responsive] : appropriately responsive [Alert] : alert [No Acute Distress] : no acute distress [Regular Rate Rhythm] : regular rate rhythm [No Murmurs] : no murmurs [Clear to Auscultation B/L] : clear to auscultation bilaterally [Soft] : soft [Non-tender] : non-tender [Non-distended] : non-distended [Oriented x3] : oriented x3 [Labia Majora] : normal [Labia Minora] : normal [No Bleeding] : There was no active vaginal bleeding [Normal] : normal [Normal Position] : in a normal position [Uterine Adnexae] : non-palpable [Tenderness] : nontender [Enlarged ___ wks] : not enlarged [FreeTextEntry5] : Closed cervical os.  [FreeTextEntry8] : No tenderness on bimanual exam.

## 2021-12-11 NOTE — HISTORY OF PRESENT ILLNESS
[FreeTextEntry1] : Ms. Smith is a 24 y/o  who presents for follow up b-hcg. \par \par Last LMP was 10/19. Pt reports regular monthly periods. Pt took an at home pregnancy test on  which was positive. Noted heavy vaginal bleeding on  and was evaluated by the ER. Pt has continued to spot off an on since then. Last episode of bleeding on  described as "coffee ground granules" with Valsalva, getting increasingly lighter over the last 2 days. Light spotting noted today. \par \par Pt also notes a change in odor to her vaginal discharge, attributed to the frequent vaginal bleeding. No change in apperence, irritation, or pain. Pt also notes she is significantly more fatigued that prior to pregnancy and feels "cold more often" Pt reports intermittent HA with a known history of HA. Short lasting (2-3 hours), relived with IBU or acetaminophen. Pt has no significant medical history to be reports. Reports a family history of DM. \par \par Gestational sac and FHT were identified on TVUS today. This is a desired pregnancy and patient is excited to presue prenatal care. Has an appointment on  at [facility] at [place].

## 2021-12-17 DIAGNOSIS — N76.0 ACUTE VAGINITIS: ICD-10-CM

## 2021-12-17 DIAGNOSIS — B96.89 ACUTE VAGINITIS: ICD-10-CM

## 2021-12-27 ENCOUNTER — APPOINTMENT (OUTPATIENT)
Dept: OBGYN | Facility: CLINIC | Age: 26
End: 2021-12-27
Payer: COMMERCIAL

## 2021-12-27 VITALS
BODY MASS INDEX: 27.64 KG/M2 | HEIGHT: 63 IN | OXYGEN SATURATION: 98 % | DIASTOLIC BLOOD PRESSURE: 65 MMHG | WEIGHT: 156 LBS | TEMPERATURE: 97.7 F | HEART RATE: 83 BPM | SYSTOLIC BLOOD PRESSURE: 107 MMHG

## 2021-12-27 VITALS
SYSTOLIC BLOOD PRESSURE: 107 MMHG | BODY MASS INDEX: 27.64 KG/M2 | HEIGHT: 63 IN | WEIGHT: 156 LBS | OXYGEN SATURATION: 98 % | HEART RATE: 83 BPM | DIASTOLIC BLOOD PRESSURE: 65 MMHG | TEMPERATURE: 97.7 F

## 2021-12-27 DIAGNOSIS — Z83.3 FAMILY HISTORY OF DIABETES MELLITUS: ICD-10-CM

## 2021-12-27 DIAGNOSIS — Z83.49 FAMILY HISTORY OF OTHER ENDOCRINE, NUTRITIONAL AND METABOLIC DISEASES: ICD-10-CM

## 2021-12-27 DIAGNOSIS — Z34.90 ENCOUNTER FOR SUPERVISION OF NORMAL PREGNANCY, UNSPECIFIED, UNSPECIFIED TRIMESTER: ICD-10-CM

## 2021-12-27 DIAGNOSIS — O20.0 THREATENED ABORTION: ICD-10-CM

## 2021-12-27 DIAGNOSIS — Z78.9 OTHER SPECIFIED HEALTH STATUS: ICD-10-CM

## 2021-12-27 PROCEDURE — 76815 OB US LIMITED FETUS(S): CPT | Mod: 59

## 2021-12-27 PROCEDURE — 99204 OFFICE O/P NEW MOD 45 MIN: CPT | Mod: 25

## 2021-12-27 RX ORDER — CYCLOBENZAPRINE HYDROCHLORIDE 10 MG/1
10 TABLET, FILM COATED ORAL
Qty: 10 | Refills: 0 | Status: COMPLETED | COMMUNITY
Start: 2021-10-06

## 2021-12-27 RX ORDER — PRENATAL VIT NO.130/IRON/FOLIC 27MG-0.8MG
28-0.8 TABLET ORAL
Qty: 30 | Refills: 0 | Status: COMPLETED | COMMUNITY
Start: 2021-12-09

## 2021-12-27 RX ORDER — DIPHENHYDRAMINE HCL 25 MG/1
25 TABLET ORAL
Qty: 20 | Refills: 0 | Status: COMPLETED | COMMUNITY
Start: 2021-10-07

## 2021-12-27 RX ORDER — METHYLPREDNISOLONE 4 MG/1
4 TABLET ORAL
Qty: 21 | Refills: 0 | Status: COMPLETED | COMMUNITY
Start: 2021-10-07

## 2021-12-27 RX ORDER — VITAMIN A, ASCORBIC ACID, CHOLECALCIFEROL, TOCOPHEROL, THIAMINE MONONITRATE, RIBOFLAVIN, PYRIDOXINE, FOLIC ACID, CYANOCOBALAMIN, CALCIUM CARBONATE, FERROUS FUMARATE, ZINC OXIDE, CUPRIC OXIDE, NIACINAMIDE, AND FISH OIL 27-1-250MG
27-1 & 312 KIT ORAL
Refills: 0 | Status: COMPLETED | COMMUNITY

## 2021-12-27 RX ORDER — NAPROXEN 500 MG/1
500 TABLET ORAL
Qty: 20 | Refills: 0 | Status: COMPLETED | COMMUNITY
Start: 2021-10-06

## 2021-12-27 RX ORDER — DIPHENHYDRAMINE HYDROCHLORIDE 25 MG/1
25 CAPSULE ORAL
Qty: 25 | Refills: 0 | Status: COMPLETED | COMMUNITY
Start: 2021-08-01

## 2021-12-27 NOTE — HISTORY OF PRESENT ILLNESS
[Normal Amount/Duration] :  normal amount and duration [Regular Cycle Intervals] : periods have been regular [Menarche Age: ____] : age at menarche was [unfilled] [FreeTextEntry1] : 10/19/2021 [Currently Active] : currently active [Men] : men [Vaginal] : vaginal [No] : No

## 2021-12-27 NOTE — PHYSICAL EXAM
[Appropriately responsive] : appropriately responsive [Alert] : alert [No Acute Distress] : no acute distress [No Lymphadenopathy] : no lymphadenopathy [Regular Rate Rhythm] : regular rate rhythm [No Murmurs] : no murmurs [Clear to Auscultation B/L] : clear to auscultation bilaterally [Soft] : soft [Non-tender] : non-tender [Non-distended] : non-distended [No HSM] : No HSM [No Lesions] : no lesions [No Mass] : no mass [Oriented x3] : oriented x3 [Examination Of The Breasts] : a normal appearance [No Masses] : no breast masses were palpable [Discharge] : discharge [Scant] : scant [Foul Smelling] : not foul smelling [White] : white [Blood-Tinged] : blood-tinged [Normal] : normal [Normal Position] : in a normal position [Enlarged ___ wks] : enlarged [unfilled] ~Uweeks [Uterine Adnexae] : normal

## 2021-12-27 NOTE — PROCEDURE
[Cervical Pap Smear] : cervical Pap smear [Liquid Base] : liquid base [GC & Chlamydia via Pap] : GC & Chlamydia via Pap [Tolerated Well] : the patient tolerated the procedure well [No Complications] : there were no complications [Transvaginal OB Sonogram] : Transvaginal OB Sonogram [Intrauterine Pregnancy] : intrauterine pregnancy [Yolk Sac] : yolk sac present [Fetal Heart] : fetal heart present [CRL: ___ (mm)] : CRL - [unfilled]Umm [Date: ___] : Date: [unfilled] [Current GA by Sonogram: ___ (wks)] : Current GA by Sonogram: [unfilled]Uwks [___ day(s)] : [unfilled] days [Transvaginal OB Sonogram WNL] : Transvaginal OB Sonogram WNL

## 2021-12-28 LAB
ABO + RH PNL BLD: NORMAL
APPEARANCE: CLEAR
BACTERIA: NEGATIVE
BASOPHILS # BLD AUTO: 0.03 K/UL
BASOPHILS NFR BLD AUTO: 0.3 %
BILIRUBIN URINE: NEGATIVE
BLD GP AB SCN SERPL QL: NORMAL
BLOOD URINE: NEGATIVE
C TRACH RRNA SPEC QL NAA+PROBE: NOT DETECTED
COLOR: NORMAL
EOSINOPHIL # BLD AUTO: 0.16 K/UL
EOSINOPHIL NFR BLD AUTO: 1.4 %
GLUCOSE QUALITATIVE U: NEGATIVE
HBV SURFACE AG SER QL: NONREACTIVE
HCT VFR BLD CALC: 41.9 %
HGB A MFR BLD: 96.1 %
HGB A2 MFR BLD: 2.9 %
HGB BLD-MCNC: 14 G/DL
HGB F MFR BLD: 1 %
HGB FRACT BLD-IMP: NORMAL
HIV1+2 AB SPEC QL IA.RAPID: NONREACTIVE
HYALINE CASTS: 0 /LPF
IMM GRANULOCYTES NFR BLD AUTO: 0.4 %
KETONES URINE: NEGATIVE
LEAD BLD-MCNC: <1 UG/DL
LEUKOCYTE ESTERASE URINE: NEGATIVE
LYMPHOCYTES # BLD AUTO: 3.2 K/UL
LYMPHOCYTES NFR BLD AUTO: 28.9 %
MAN DIFF?: NORMAL
MCHC RBC-ENTMCNC: 30.5 PG
MCHC RBC-ENTMCNC: 33.4 GM/DL
MCV RBC AUTO: 91.3 FL
MICROSCOPIC-UA: NORMAL
MONOCYTES # BLD AUTO: 0.7 K/UL
MONOCYTES NFR BLD AUTO: 6.3 %
N GONORRHOEA RRNA SPEC QL NAA+PROBE: NOT DETECTED
NEUTROPHILS # BLD AUTO: 6.95 K/UL
NEUTROPHILS NFR BLD AUTO: 62.7 %
NITRITE URINE: NEGATIVE
PH URINE: 7.5
PLATELET # BLD AUTO: 298 K/UL
PROTEIN URINE: NORMAL
RBC # BLD: 4.59 M/UL
RBC # FLD: 12.6 %
RED BLOOD CELLS URINE: 2 /HPF
RUBV IGG FLD-ACNC: 19.3 INDEX
RUBV IGG SER-IMP: POSITIVE
SOURCE TP AMPLIFICATION: NORMAL
SPECIFIC GRAVITY URINE: 1.01
SQUAMOUS EPITHELIAL CELLS: 1 /HPF
T PALLIDUM AB SER QL IA: NEGATIVE
URINE COMMENTS: NORMAL
UROBILINOGEN URINE: NORMAL
WBC # FLD AUTO: 11.08 K/UL
WHITE BLOOD CELLS URINE: 1 /HPF

## 2021-12-29 LAB
BACTERIA UR CULT: NORMAL
M TB IFN-G BLD-IMP: NEGATIVE
QUANTIFERON TB PLUS MITOGEN MINUS NIL: 7.9 IU/ML
QUANTIFERON TB PLUS NIL: 0.06 IU/ML
QUANTIFERON TB PLUS TB1 MINUS NIL: 0.05 IU/ML
QUANTIFERON TB PLUS TB2 MINUS NIL: 0 IU/ML

## 2022-01-03 LAB — CYTOLOGY CVX/VAG DOC THIN PREP: NORMAL

## 2022-01-10 LAB — CFTR MUT TESTED BLD/T: NEGATIVE

## 2022-01-24 ENCOUNTER — APPOINTMENT (OUTPATIENT)
Dept: OBGYN | Facility: CLINIC | Age: 27
End: 2022-01-24
Payer: COMMERCIAL

## 2022-01-24 VITALS
BODY MASS INDEX: 26.58 KG/M2 | SYSTOLIC BLOOD PRESSURE: 112 MMHG | WEIGHT: 150 LBS | TEMPERATURE: 98.2 F | HEIGHT: 63 IN | DIASTOLIC BLOOD PRESSURE: 73 MMHG | OXYGEN SATURATION: 98 % | HEART RATE: 119 BPM

## 2022-01-24 PROCEDURE — 99213 OFFICE O/P EST LOW 20 MIN: CPT

## 2022-01-24 RX ORDER — TRIAMCINOLONE ACETONIDE 1 MG/G
0.1 PASTE DENTAL
Qty: 5 | Refills: 0 | Status: COMPLETED | COMMUNITY
Start: 2022-01-21

## 2022-01-24 RX ORDER — MUPIROCIN 20 MG/G
2 OINTMENT TOPICAL
Qty: 22 | Refills: 0 | Status: COMPLETED | COMMUNITY
Start: 2022-01-21

## 2022-01-24 RX ORDER — METRONIDAZOLE 7.5 MG/G
0.75 GEL VAGINAL
Qty: 1 | Refills: 0 | Status: COMPLETED | COMMUNITY
Start: 2021-12-17 | End: 2022-01-24

## 2022-01-24 RX ORDER — PRENATAL VIT NO.130/IRON/FOLIC 27MG-0.8MG
27-0.8 TABLET ORAL DAILY
Qty: 1 | Refills: 6 | Status: ACTIVE | COMMUNITY
Start: 2021-12-09 | End: 1900-01-01

## 2022-02-04 ENCOUNTER — APPOINTMENT (OUTPATIENT)
Dept: ANTEPARTUM | Facility: CLINIC | Age: 27
End: 2022-02-04

## 2024-04-26 NOTE — ED ADULT TRIAGE NOTE - SOURCE OF INFORMATION
Patient Detail Level: Zone Render In Strict Bullet Format?: No Continue Regimen: Wash face with Cervae foaming wash twice a day\\nContinue Apply a thin coat of Aklief to the T-zone every other night. Modify Regimen: Pt can take the Doryx MPC 60mg when she gets her menstrual cycles or can take it everyday. Leaving it up to the patient due to patient having such abnormal cycles.